# Patient Record
Sex: FEMALE | URBAN - METROPOLITAN AREA
[De-identification: names, ages, dates, MRNs, and addresses within clinical notes are randomized per-mention and may not be internally consistent; named-entity substitution may affect disease eponyms.]

---

## 2022-12-09 ENCOUNTER — TELEPHONE (OUTPATIENT)
Dept: FAMILY MEDICINE CLINIC | Age: 22
End: 2022-12-09

## 2022-12-10 NOTE — TELEPHONE ENCOUNTER
CLINICAL STAFF: Please call mom and ask if pt has been seen yet.  Please coordinate an appointment if still needed

## 2022-12-10 NOTE — TELEPHONE ENCOUNTER
----- Message from Jasper Davis sent at 2022  9:21 AM EST -----  Subject: Message to Provider    QUESTIONS  Information for Provider? Pt needs to schedule her  Julius Parmer   that born on [birth year] an appt. Called the office line twice and got the   fax sound and busy tone. Pls give [place] a call and if she doesn't answer   call her mom Martha Spotted at [phone]  ---------------------------------------------------------------------------  --------------  [id number]  [phone]; OK to leave message on voicemail  ---------------------------------------------------------------------------  --------------  SCRIPT ANSWERS  Relationship to Patient?  Self